# Patient Record
(demographics unavailable — no encounter records)

---
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
## 2025-01-31 NOTE — ASSESSMENT
[FreeTextEntry1] : Recurrent UTI is defined as two episodes of acute bacterial cystitis within six months or three episodes within one year. Only culture date for one episode here but patient reports + cultures and was recently hospitalized for febrile UTI. We discussed given complicated UTI recommend work-up. CT A/p with IV contrast 1/4/25: no evidence of renal/ureteric calculus of hydroureteronephrosis. US today wnl. Discussed role of cysto in future to evaluate.  We discussed the natural history of UTIs and strategies to prevent incidence of UTIs. We discussed the data related to increase water intake and cranberry extract daily. We discussed antibiotic prophylaxis both continuous and post-coital. Discussed role estrace cream and hiprex. Will initiate estrace, cranberry, probiotics.  With regards to acute symptoms - present for past month - unclear if has been properly treated. When hospitalized in [place] only got 3 days IV abx - undertreatment for complicated UTI. Intiial urgent care culture obtained - S to cipro. D/w ID pharmacist Lyle warning cipro and glipizide - ok as long as monitor blood sugar not contraindication. Also called PCP Dr. Mateo Edward and discussed plan for 7 days cipro - in agreement with plan and patient has fu with him Monday. Furthermore, would like to arrange f/u with ID given complicated course. We also discussed given history febrile UTI low threshold to go to ED - if develops any fevers, chills, worsening back pain, N/V she is to proceed directly to ED.  Plan: - UA culture - cysto in future - estrace cream - ID f/u - cranberry pills, probiotics - abx to pharmacy - strict warning precautions discussed - fu for cysto   I have spent 65 minutes of time on the encounter which excludes teaching and/or separately reported services.

## 2025-01-31 NOTE — HISTORY OF PRESENT ILLNESS
[FreeTextEntry1] : Name DOC MA  MRN [id number]   May  2 1957  Contact Number: [phone] ----------------------------------------------------------------------------------------------------------------------------------------- Date of First Visit: 25 Referring Provider/PCP: Dr. Mateo Edward f: [phone] -----------------------------------------------------------------------------------------------------------------------------------------  CC: recurrent UTIs, history nephrolithiasis  History of Present Illness: DOC MA is a 67 year old female who presents for recurrent UTIs. Became more an issue 1-1.5 years. Reports 3-4 UTIs over this time. Culture proven. Symptoms pain with urination, suprapubic pain, back pain, frequency urgency. Treated with antibiotics. Symptoms go away with abx but then will come back a few weeks later and will get yeast infections. Patient reports end of December, developed suprapubic pain, urinary incontinence. Went to urgent care and started abx macrobid - a couple days after called patient and told her needed stronger antibiotics. Then went to [place] for [place] - developed fevers, chills went to ED in [place]. Was already on the antibiotics from urgent careDid CT A/p withIV contrast 25: no evidence of renal/ureteric calculus of hydroureteronephrosis. Admitted patient, started IV antibiotics. Gave 3 days antibiotics and did not dc on oral abx. Started to improve but then symptoms came back again on 25. Now with suprapubic pain. But no fevers, chills, flank pain. Does report low back pain, worse on the right    Labs 25: Cr 0.6  25: UA WBC 6, 13 RBC Blood cx no growth  UCx 24: E. coli - R Bactrim, R amp  US today: unremarkable exam. Both kidneys are normal in size and echogenicity without hydronephrosis, stones or solid masses present.   PVR (to ensure adequate emptying): 0  PMH: afib, cardiomyopathy, HTN, anxiety, constipation, DM, asthma, arthritis, HLD, colitis PSH: hysterectomy, oopherectomy, bladder lift, diverticulitis surgery - sigmoidectomy Family History: no  malignancies  Social: never smoker, no alcohol, no recreational drugs Meds: eliquis, digoxin, cardizem, losartan, singulair, metformin, glipizide, zolpidem, pantroprazole, pravastatin, xolair injections, cimizia injection, ajovy injetion, trulicty, lexapro Allergies: beta blockers ROS: no fevers or chills + low back pain

## 2025-03-12 NOTE — HISTORY OF PRESENT ILLNESS
[FreeTextEntry1] : Name DOC MA  MRN [id number]   May  2 1957  Contact Number: [phone] ----------------------------------------------------------------------------------------------------------------------------------------- Date of First Visit: 25 Referring Provider/PCP: Dr. Mateo Edward f: [phone] -----------------------------------------------------------------------------------------------------------------------------------------  CC: recurrent UTIs, history nephrolithiasis  History of Present Illness: DOC MA is a 67 year old female who presents for recurrent UTIs. Became more an issue 1-1.5 years. Reports 3-4 UTIs over this time. Culture proven. Symptoms pain with urination, suprapubic pain, back pain, frequency urgency. Treated with antibiotics. Symptoms go away with abx but then will come back a few weeks later and will get yeast infections. Patient reports end of December, developed suprapubic pain, urinary incontinence. Went to urgent care and started abx macrobid - a couple days after called patient and told her needed stronger antibiotics. Then went to [place] for [place] - developed fevers, chills went to ED in [place]. Was already on the antibiotics from urgent careDid CT A/p withIV contrast 25: no evidence of renal/ureteric calculus of hydroureteronephrosis. Admitted patient, started IV antibiotics. Gave 3 days antibiotics and did not dc on oral abx. Started to improve but then symptoms came back again on 25. Now with suprapubic pain. But no fevers, chills, flank pain. Does report low back pain, worse on the right    Labs 25: Cr 0.6  25: UA WBC 6, 13 RBC Blood cx no growth  UCx 24: E. coli - R Bactrim, R amp  US today: unremarkable exam. Both kidneys are normal in size and echogenicity without hydronephrosis, stones or solid masses present.   PVR (to ensure adequate emptying): 0 ---------------------------------------------------------------------------------------------------------------------------------------- Interval History (2025):  Reports had hysterectomy 20 years ago - had "bladder lift" at that time - unsure if mesh or sling - reports was for leakage.  Urine studies last visit: 25: UA + gluc, nit neg LEneg 0 WBC 1 RBC UCx <10K vida  Patient completed course of cipro and reported improvement but not resolution of symptoms - reports still intermittent burning, frequency. Has been compliant with estrace cream, cranberry and probitoics.   Urine studies PCP 25: UA nit neg LE neg gluc +, + ca ox 0 RBC 0-5 WBC UCx <10K CFU  Cysto: at dome of bladder tiny diverticulum but with channel - scoped passed through and end and no suspicious lesions or foreign bodies present, otherwise wnl ---------------------------------------------------------------------------------------------------------------------------------------- PMH: afib, cardiomyopathy, HTN, anxiety, constipation, DM, asthma, arthritis, HLD, colitis PSH: hysterectomy, oopherectomy, bladder lift, diverticulitis surgery - sigmoidectomy Family History: no  malignancies  Social: never smoker, no alcohol, no recreational drugs Meds: eliquis, digoxin, cardizem, losartan, singulair, metformin, glipizide, zolpidem, pantroprazole, pravastatin, xolair injections, cimizia injection, ajovy injetion, trulicty, lexapro Allergies: beta blockers ROS: no fevers or chills + low back pain

## 2025-03-12 NOTE — LETTER BODY
[Dear  ___] : Dear  [unfilled], [Courtesy Letter:] : I had the pleasure of seeing your patient, [unfilled], in my office today. [Please see my note below.] : Please see my note below. [Consult Closing:] : Thank you very much for allowing me to participate in the care of this patient.  If you have any questions, please do not hesitate to contact me. [Sincerely,] : Sincerely, [FreeTextEntry3] : Berenice Murray MD Director of Robotic Education [facility] at [facility]   [email or web address] [phone]

## 2025-03-12 NOTE — ASSESSMENT
[FreeTextEntry1] : Recurrent UTI is defined as two episodes of acute bacterial cystitis within six months or three episodes within one year. Only culture date for one episode here but patient reports + cultures and was recently hospitalized for febrile UTI. We discussed given complicated UTI recommend work-up. CT A/p with IV contrast 1/4/25: no evidence of renal/ureteric calculus of hydroureteronephrosis. US wnl. Cysto: at dome of bladder tiny diverticulum but with channel - scoped passed through and end and no suspicious lesions or foreign bodies present, otherwise wnl. Unclear etiology - likely not contributing to UTI - patient has history of "bladder lift and hysterectomy - possibly post surgical? Will otain CTU to better evaluate, ensure no urachal diverticulum.  We discussed the natural history of UTIs and strategies to prevent incidence of UTIs. We discussed the data related to increase water intake and cranberry extract daily. We discussed antibiotic prophylaxis both continuous and post-coital. Discussed role estrace cream and hiprex. Will initiate estrace, cranberry, probiotics - has since started. Mild improvement in symptoms. Suspect not all symptoms UTI related and element GUSM - continue estrace and initiate PFPT.    Plan: - CTU - estrace cream - ID f/u in place - cranberry pills, probiotics - strict warning precautions discussed - PFPT - decrease bladder triggers - fu after CTU  In addition to today's procedure I spent an additional 32 minutes on face to face counseling, coordination of care, and clinical documentation.

## 2025-03-19 NOTE — PHYSICAL EXAM
[General Appearance - Alert] : alert [General Appearance - In No Acute Distress] : in no acute distress [Sclera] : the sclera and conjunctiva were normal [Outer Ear] : the ears and nose were normal in appearance [Neck Appearance] : the appearance of the neck was normal [] : no respiratory distress [Auscultation Breath Sounds / Voice Sounds] : lungs were clear to auscultation bilaterally [Heart Rate And Rhythm] : heart rate was normal and rhythm regular [Heart Sounds] : normal S1 and S2 [Bowel Sounds] : normal bowel sounds [Abdomen Soft] : soft [Costovertebral Angle Tenderness] : no CVA tenderness [FreeTextEntry1] : supurapubic area ttp

## 2025-03-19 NOTE — ASSESSMENT
[FreeTextEntry1] : 67F h/o Dahiana, RA on CIMZIA (certolizumab) for a year, Afib, T2DM (A1C 9.0), HTN, cardiomyopathy, asthma, p/w management of Dahiana.   While patient has symptoms c/f UTI, patient reports lack of response to abx and UA/UCx negative in 1/31/25 when she was symptomatic.   I am not sure if her current persistent symptoms are due to Dahiana or pelvic dysfunction.  Will check UAX now.  If UCx positive, then will treat for UTI followed by Methenamine Hippurate ppx trial.  If UAX is negative, then unlikely due to UTI.  Will also see what CTU shows.  Patient said she got referral for pelvic exercise PT but there was very few available near her home (in [place]) and hasn't been able to make any appointment. Will notify Dr. Murray.   We also discussed about risk factors for Dahiana, including uncontrolled T2DM and immunosuppression.  She has been on anti-TNF for many years and never had UTI issue while on Ramicade, and now patient thinks she has Dahiana after switching to Cimzia.  Unclear correlation but can certainly be related.    - UAX - CTU on 4/1/25 - RTC after CTU - f/u Dr. Murray ()

## 2025-03-19 NOTE — HISTORY OF PRESENT ILLNESS
[FreeTextEntry1] : 67F h/o Dahiana, RA on CIMZIA (certolizumab) for a year, Afib, T2DM (A1C 9.0), HTN, cardiomyopathy, asthma, p/w management of Dahiana.  She was referred to me by Dr. Murray.  Patient reports that she started to have Dahiana about 1-1.5 years ago.  she believes she had UTI 3-4 times in 2024 and was treated with abx.  When she has UTI, her symptoms are dysuria, incontinence, suprapubic pain.  Sometimes her episode comes with fever, chills and flank pain.  She doesn't think antibiotics helps her.  She said UCx is always positive, and different bacteria.   She was admitted to the hospital in [place] in 12/2025 and was diagnosed with sepsis 2/2 cUTI.  She received 3 days of IV abx and was sent home w/o abx.  Her symptom never went away since and she continues to have persistent dysuria, suprapubic pain, white discharge, chills, lower back pain but no fever, hematuria.   She saw Dr. Murray on 1/31/25 and UA negative (but glucose 1000), UCx NUF. She was treated with Cipro x 7 days without any improvement.  She has h/o nephrolithiasis many years ago.  she had cystoscopy on 3/12 and was told unremarkable.  She is scheduled for CTU.  she was referred to ID for management of Dahiana.  Never been on ppx abx and never taken Hippurate.   Of note, she has h/o RA and sees Dr. Aravind Russell ([facility] at [facility]).  She was put on Cimzia (certolizumab, anti-TNF) every 2 weeks for a year and she recently learned that it is a/w increased infection so she stopped taking about a month ago.  She was previously on Remicade for many years but had to stop due to insurances stopped covering.  She never had any infection complication due to Ramicade.  For T2DM, A1C has been 7-8 but during 12/2024 admission, A1C was 9.0.  she had hysterectomy, oophorectomy and bladder lift around 2010.  She believes she had menopause at that time.  [Sexually Active] : The patient is not sexually active [de-identified] : Born and grew up in [place] [de-identified] : Retired  [de-identified] :  but  [de-identified] : Alone, but daughter lives in the same building

## 2025-04-02 NOTE — ASSESSMENT
[FreeTextEntry1] : Recurrent UTI is defined as two episodes of acute bacterial cystitis within six months or three episodes within one year. Only culture date for one episode here but patient reports + cultures and was recently hospitalized for febrile UTI. We discussed given complicated UTI recommend work-up. CT A/p with IV contrast 1/4/25: no evidence of renal/ureteric calculus of hydroureteronephrosis. US wnl. Cysto: at dome of bladder tiny diverticulum but with channel - scoped passed through and end and no suspicious lesions or foreign bodies present, otherwise wnl. Unclear etiology - likely not contributing to UTI - patient has history of "bladder lift and hysterectomy - possibly post surgical? Obtained CTU to better evaluate - CTU 4/1/25: Punctate non-obstructing stone right interpolar (d/w rads <1mm). No hydronephrosis. Tiny renal cysts and too small to characterize foci present bilaterally. Overall symmetric renal parenchymal enhancement. Ureters no significant hydroureter. Bladder under distended making assessment less optimal. (report scanned in) - D/w  images and cysto findings with Dr. Benitez - no evidence of urachal cyst/diverticulum, however, does appear that bladder was tacked up to abdominal wall/umbilicus. Likely post surgical. Sent images and reviewed from 2017 this has been present and stable. No worrisome findings.   We discussed the natural history of UTIs and strategies to prevent incidence of UTIs. We discussed the data related to increase water intake and cranberry extract daily. We discussed antibiotic prophylaxis both continuous and post-coital. Discussed role estrace cream and hiprex. Will initiate estrace, cranberry, probiotics - has since started. Mild improvement in symptoms. Suspect not all symptoms UTI related and element GUSM - continue estrace and initiate PFPT. Also discussed role of DM and A1C 9 as well as glucosuria and contribution to symptoms - recommend fu with endocrine and PCP.   Discussed imaging with patient - given findings stable since 2017 and recent cultures all negative in presence of symptoms do not suspect bladder findings contributing and likely incidental. Will however continue to monitor. Discussed <1mm stone - do not suspect this is contributing to symptoms. Discussed options - observation, URS, ESWL - given punctate will observe for now. Warning precautions discussed. Plan for US in 6 months.  Plan: - estrace cream - cranberry pills, probiotics - strict warning precautions discussed - decrease bladder triggers - PFPT: emailed [facility] to get patient in- confirmed availability for mid April and d/w patient - gyn referral given vaginal itching - endocrine fu - fu PCP/GI re incidental findings (seeing GI ledy and will discuss fatty liver) - ID f/u as planned - discussed case and findings with Dr. Cortez - renal US 6 months - fu 3 months, notify with exacerbation of symptoms - will obtain outside iamges for review

## 2025-04-02 NOTE — LETTER BODY
[Dear  ___] : Dear  [unfilled], [Courtesy Letter:] : I had the pleasure of seeing your patient, [unfilled], in my office today. [Please see my note below.] : Please see my note below. [Consult Closing:] : Thank you very much for allowing me to participate in the care of this patient.  If you have any questions, please do not hesitate to contact me. [Sincerely,] : Sincerely, [FreeTextEntry3] : Berenice Murray MD Director of Robotic Education [facility] at [facility]   [email or web address] [phone]

## 2025-04-02 NOTE — HISTORY OF PRESENT ILLNESS
[FreeTextEntry1] : Name DOC MA  MRN [id number]   May  2 1957  Contact Number: [phone] ----------------------------------------------------------------------------------------------------------------------------------------- Date of First Visit: 25 Referring Provider/PCP: Dr. Mateo Edward f: [phone] -----------------------------------------------------------------------------------------------------------------------------------------  CC: recurrent UTIs, history nephrolithiasis  History of Present Illness: DOC MA is a 67 year old female who presents for recurrent UTIs. Became more an issue 1-1.5 years. Reports 3-4 UTIs over this time. Culture proven. Symptoms pain with urination, suprapubic pain, back pain, frequency urgency. Treated with antibiotics. Symptoms go away with abx but then will come back a few weeks later and will get yeast infections. Patient reports end of December, developed suprapubic pain, urinary incontinence. Went to urgent care and started abx macrobid - a couple days after called patient and told her needed stronger antibiotics. Then went to [place] for [place] - developed fevers, chills went to ED in [place]. Was already on the antibiotics from urgent care. Did CT A/p withIV contrast 25: no evidence of renal/ureteric calculus of hydroureteronephrosis. Admitted patient, started IV antibiotics. Gave 3 days antibiotics and did not dc on oral abx. Started to improve but then symptoms came back again on 25. Now with suprapubic pain. But no fevers, chills, flank pain. Does report low back pain, worse on the right    Labs 25: Cr 0.6  25: UA WBC 6, 13 RBC Blood cx no growth  UCx 24: E. coli - R Bactrim, R amp  US today: unremarkable exam. Both kidneys are normal in size and echogenicity without hydronephrosis, stones or solid masses present.   PVR (to ensure adequate emptying): 0 ---------------------------------------------------------------------------------------------------------------------------------------- Interval History (2025):  Reports had hysterectomy 20 years ago - had "bladder lift" at that time - unsure if mesh or sling - reports was for leakage.  Urine studies last visit: 25: UA + gluc, nit neg LEneg 0 WBC 1 RBC UCx <10K vida  Patient completed course of cipro and reported improvement but not resolution of symptoms - reports still intermittent burning, frequency. Has been compliant with estrace cream, cranberry and probitoics.   Urine studies PCP 25: UA nit neg LE neg gluc +, + ca ox 0 RBC 0-5 WBC UCx <10K CFU  Cysto: at dome of bladder tiny diverticulum but with channel - scoped passed through and end and no suspicious lesions or foreign bodies present, otherwise wnl ---------------------------------------------------------------------------------------------------------------------------------------- Interval History (2025):  Last visit prescribed estrace cream and PFPT. Saw ID 3/19/25 - symptoms c/f UTI, but lack of response to abx and UA/UCx negative in 25 when she was symptomatic- symptoms not maybe not be from Dahiana - more consistent with pelvic dysfunction. Again had symptoms at time of that visit and urine studies sent:  Urine 3/19/25: UA: glucose >1000, trace LE, nit neg, 2 WBC 0 RBC, hyaline casts present, protein present, + epithelial cells + calcium oxalate UCx <10K vida  Still with itching and burning - both urethral and vaginal. Mild improvement with estrace cream but not enough. Persistent low back pain but not flank pain. Couldn't find PFPT nearby. Reports last A1C 9.0.  CTU 25: Punctate non-obstructing stone right interpolar (d/w rads <1mm). No hydronephrosis. Tiny renal cysts and too small to characterize foci present bilaterally. Overall symmetric renal parenchymal enhancement. Ureters no significant hydroureter. Bladder under distended making assessment less optimal. (report scanned in) - D/w  images and cysto findings with Dr. Benitez - no evidence of urachal cyst/diverticulum, however, does appear that bladder was tacked up to abdominal wall/umbilicus. Likely post surgical. Sent images and reviewed from 2017 this has been present and stable. No worrisome findings.   CT 4/3/24 [facility]: no evidence of stones or hydro ---------------------------------------------------------------------------------------------------------------------------------------- PMH: afib, cardiomyopathy, HTN, anxiety, constipation, DM, asthma, arthritis, HLD, colitis PSH: hysterectomy, oopherectomy, bladder lift, diverticulitis surgery - sigmoidectomy Family History: no  malignancies  Social: never smoker, no alcohol, no recreational drugs Meds: eliquis, digoxin, cardizem, losartan, singulair, metformin, glipizide, zolpidem, pantroprazole, pravastatin, xolair injections, cimizia injection, ajovy injetion, trulicty, lexapro Allergies: beta blockers ROS: no fevers or chills + low back pain